# Patient Record
(demographics unavailable — no encounter records)

---

## 2025-04-15 NOTE — HEALTH RISK ASSESSMENT
[Good] : ~his/her~  mood as  good [No] : In the past 12 months have you used drugs other than those required for medical reasons? No [No falls in past year] : Patient reported no falls in the past year [0] : 2) Feeling down, depressed, or hopeless: Not at all (0) [PHQ-2 Negative - No further assessment needed] : PHQ-2 Negative - No further assessment needed [Never] : Never [No Retinopathy] : No retinopathy [Patient declined bone density test] : Patient declined bone density test [HIV test declined] : HIV test declined [Hepatitis C test declined] : Hepatitis C test declined [None] : None [With Family] : lives with family [Employed] : employed [] :  [Sexually Active] : sexually active [Feels Safe at Home] : Feels safe at home [Fully functional (bathing, dressing, toileting, transferring, walking, feeding)] : Fully functional (bathing, dressing, toileting, transferring, walking, feeding) [Fully functional (using the telephone, shopping, preparing meals, housekeeping, doing laundry, using] : Fully functional and needs no help or supervision to perform IADLs (using the telephone, shopping, preparing meals, housekeeping, doing laundry, using transportation, managing medications and managing finances) [Reports normal functional visual acuity (ie: able to read med bottle)] : Reports normal functional visual acuity [Smoke Detector] : smoke detector [Carbon Monoxide Detector] : carbon monoxide detector [Safety elements used in home] : safety elements used in home [Seat Belt] :  uses seat belt [Sunscreen] : uses sunscreen [With Patient/Caregiver] : , with patient/caregiver [FreeTextEntry1] : feels unhealthy being at her weight  [de-identified] : denies  [de-identified] : cardiologist- going well down to 1 year check ups  [de-identified] : as above  [EyeExamDate] : 7/2024 [Change in mental status noted] : No change in mental status noted [Language] : denies difficulty with language [Behavior] : denies difficulty with behavior [Learning/Retaining New Information] : denies difficulty learning/retaining new information [Handling Complex Tasks] : denies difficulty handling complex tasks [Reasoning] : denies difficulty with reasoning [Spatial Ability and Orientation] : denies difficulty with spatial ability and orientation [High Risk Behavior] : no high risk behavior [Reports changes in hearing] : Reports no changes in hearing [Reports changes in vision] : Reports no changes in vision [Reports changes in dental health] : Reports no changes in dental health [MammogramDate] : 5/2024 [PapSmearDate] : 2025 [ColonoscopyDate] : 2019 [FreeTextEntry2] : teacher

## 2025-04-15 NOTE — HISTORY OF PRESENT ILLNESS
[FreeTextEntry1] :  Patient presents for CPE [de-identified] : 51 year old female presenting in office for CPE, she has no complaints today, is down about 50 pounds. Feels her lifestyle changes could be better.  exercise walks occasionally, not consistent   diet- focuses on proteins and veggies, lower carbohydrates and does not eat late, tries to eat the latest at 5/6 pm, feels she has been off her diet lately but wants to get back to it, has been craving sweets after a meal, tries to eat prunes or dried fruit as her snack

## 2025-04-15 NOTE — PLAN
[FreeTextEntry1] : acute sinus infection with facial pressure and tenderness take antibiotics as prescribed  recommend using saline sinus rinses  due for mammo and colonscopy she will make appoint with Dr Meyer for colonscopy provided with mammo slip refusing PNA vaccine at this time  acute neck and back pain provided her with slip for PT and massage therapy will continue with OTC pain medication as needed

## 2025-04-15 NOTE — PHYSICAL EXAM
[No Acute Distress] : no acute distress [Well Nourished] : well nourished [Well Developed] : well developed [Well-Appearing] : well-appearing [Normal Sclera/Conjunctiva] : normal sclera/conjunctiva [PERRL] : pupils equal round and reactive to light [EOMI] : extraocular movements intact [Normal Outer Ear/Nose] : the outer ears and nose were normal in appearance [Normal Oropharynx] : the oropharynx was normal [No JVD] : no jugular venous distention [No Lymphadenopathy] : no lymphadenopathy [Supple] : supple [Thyroid Normal, No Nodules] : the thyroid was normal and there were no nodules present [No Respiratory Distress] : no respiratory distress  [No Accessory Muscle Use] : no accessory muscle use [Clear to Auscultation] : lungs were clear to auscultation bilaterally [Normal Rate] : normal rate  [Regular Rhythm] : with a regular rhythm [Normal S1, S2] : normal S1 and S2 [No Murmur] : no murmur heard [No Carotid Bruits] : no carotid bruits [No Abdominal Bruit] : a ~M bruit was not heard ~T in the abdomen [No Varicosities] : no varicosities [Pedal Pulses Present] : the pedal pulses are present [No Palpable Aorta] : no palpable aorta [No Extremity Clubbing/Cyanosis] : no extremity clubbing/cyanosis [Soft] : abdomen soft [Non Tender] : non-tender [Non-distended] : non-distended [No Masses] : no abdominal mass palpated [No HSM] : no HSM [Normal Bowel Sounds] : normal bowel sounds [Normal Posterior Cervical Nodes] : no posterior cervical lymphadenopathy [Normal Anterior Cervical Nodes] : no anterior cervical lymphadenopathy [No CVA Tenderness] : no CVA  tenderness [No Spinal Tenderness] : no spinal tenderness [No Joint Swelling] : no joint swelling [Grossly Normal Strength/Tone] : grossly normal strength/tone [No Rash] : no rash [Coordination Grossly Intact] : coordination grossly intact [No Focal Deficits] : no focal deficits [Normal Gait] : normal gait [Deep Tendon Reflexes (DTR)] : deep tendon reflexes were 2+ and symmetric [Normal Affect] : the affect was normal [Normal Insight/Judgement] : insight and judgment were intact [de-identified] : Edema of b/l LE

## 2025-04-30 NOTE — PHYSICAL EXAM
[Normal] : no carotid or abdominal bruits heard, no varicosities, pedal pulses are present, no peripheral edema, no extremity clubbing or cyanosis and no palpable aorta [de-identified] : b.l middle ear effusions, erythematous oropharynx [de-identified] : swollen [de-identified] : tight cough, occasional cough

## 2025-04-30 NOTE — PLAN
[FreeTextEntry1] : Acute sinusitis i yvonne encouraged her to use saline sinus rinses 3x daily if possible okay to start doxycycline take steroid pack with food do not mix with NSAIDS RTO if symptoms do not improve

## 2025-04-30 NOTE — HEALTH RISK ASSESSMENT
[0] : 2) Feeling down, depressed, or hopeless: Not at all (0) [PHQ-2 Negative - No further assessment needed] : PHQ-2 Negative - No further assessment needed [[id number]] : 0 [Never] : Never

## 2025-04-30 NOTE — HISTORY OF PRESENT ILLNESS
[FreeTextEntry1] : patient presents for congestion for 2 weeks  came in for a physical and was a little congested the day of the appt  took antibiotic felt somewhat better but a few days later  Sunday night throat was itchy and as the day progressed it started to get worst  both ears hurt  feels like a bubble head throat hurts has a headache coughing up blood and green mucus and blowing nose it's the same thing as well  [de-identified] : 51 year old female presenting in office with complaints of URI symptoms, has complaints of sinus pressure, sinus pain, blood in sinus mucous, cough, congestion. She completed course of augmentin and felt symptoms mildly improved however this Saturday symptoms significantly worsened. She feels tightness in her chest and coughing up thick green mucous. Denies fevers, chills, nausea, vomiting, severe fatigue, or other symptoms. She is taking allergy OTC medications and using old albuterol inhaler.